# Patient Record
Sex: FEMALE | Race: BLACK OR AFRICAN AMERICAN | NOT HISPANIC OR LATINO | URBAN - METROPOLITAN AREA
[De-identification: names, ages, dates, MRNs, and addresses within clinical notes are randomized per-mention and may not be internally consistent; named-entity substitution may affect disease eponyms.]

---

## 2021-11-05 ENCOUNTER — INPATIENT (INPATIENT)
Facility: HOSPITAL | Age: 81
LOS: 2 days | Discharge: HOME HEALTH | End: 2021-11-08
Attending: HOSPITALIST | Admitting: HOSPITALIST
Payer: MEDICARE

## 2021-11-05 VITALS
TEMPERATURE: 98 F | RESPIRATION RATE: 18 BRPM | WEIGHT: 179.9 LBS | OXYGEN SATURATION: 97 % | SYSTOLIC BLOOD PRESSURE: 193 MMHG | DIASTOLIC BLOOD PRESSURE: 94 MMHG | HEIGHT: 64 IN | HEART RATE: 75 BPM

## 2021-11-05 LAB
ALBUMIN SERPL ELPH-MCNC: 3.6 G/DL — SIGNIFICANT CHANGE UP (ref 3.3–5)
ALP SERPL-CCNC: 59 U/L — SIGNIFICANT CHANGE UP (ref 40–120)
ALT FLD-CCNC: 14 U/L — SIGNIFICANT CHANGE UP (ref 12–78)
ANION GAP SERPL CALC-SCNC: 7 MMOL/L — SIGNIFICANT CHANGE UP (ref 5–17)
APTT BLD: 24.4 SEC — LOW (ref 27.5–35.5)
AST SERPL-CCNC: 17 U/L — SIGNIFICANT CHANGE UP (ref 15–37)
BASOPHILS # BLD AUTO: 0 K/UL — SIGNIFICANT CHANGE UP (ref 0–0.2)
BASOPHILS NFR BLD AUTO: 0 % — SIGNIFICANT CHANGE UP (ref 0–2)
BILIRUB SERPL-MCNC: 0.5 MG/DL — SIGNIFICANT CHANGE UP (ref 0.2–1.2)
BLD GP AB SCN SERPL QL: SIGNIFICANT CHANGE UP
BUN SERPL-MCNC: 19 MG/DL — SIGNIFICANT CHANGE UP (ref 7–23)
CALCIUM SERPL-MCNC: 9.2 MG/DL — SIGNIFICANT CHANGE UP (ref 8.5–10.1)
CHLORIDE SERPL-SCNC: 110 MMOL/L — HIGH (ref 96–108)
CO2 SERPL-SCNC: 26 MMOL/L — SIGNIFICANT CHANGE UP (ref 22–31)
CREAT SERPL-MCNC: 1.46 MG/DL — HIGH (ref 0.5–1.3)
EOSINOPHIL # BLD AUTO: 0 K/UL — SIGNIFICANT CHANGE UP (ref 0–0.5)
EOSINOPHIL NFR BLD AUTO: 0 % — SIGNIFICANT CHANGE UP (ref 0–6)
FLUAV AG NPH QL: SIGNIFICANT CHANGE UP
FLUBV AG NPH QL: SIGNIFICANT CHANGE UP
GLUCOSE BLDC GLUCOMTR-MCNC: 127 MG/DL — HIGH (ref 70–99)
GLUCOSE BLDC GLUCOMTR-MCNC: 168 MG/DL — HIGH (ref 70–99)
GLUCOSE BLDC GLUCOMTR-MCNC: 99 MG/DL — SIGNIFICANT CHANGE UP (ref 70–99)
GLUCOSE SERPL-MCNC: 121 MG/DL — HIGH (ref 70–99)
HCT VFR BLD CALC: 32.1 % — LOW (ref 34.5–45)
HGB BLD-MCNC: 10.3 G/DL — LOW (ref 11.5–15.5)
INR BLD: 1.07 RATIO — SIGNIFICANT CHANGE UP (ref 0.88–1.16)
LYMPHOCYTES # BLD AUTO: 1.03 K/UL — SIGNIFICANT CHANGE UP (ref 1–3.3)
LYMPHOCYTES # BLD AUTO: 30 % — SIGNIFICANT CHANGE UP (ref 13–44)
MANUAL SMEAR VERIFICATION: SIGNIFICANT CHANGE UP
MCHC RBC-ENTMCNC: 28.2 PG — SIGNIFICANT CHANGE UP (ref 27–34)
MCHC RBC-ENTMCNC: 32.1 GM/DL — SIGNIFICANT CHANGE UP (ref 32–36)
MCV RBC AUTO: 87.9 FL — SIGNIFICANT CHANGE UP (ref 80–100)
MONOCYTES # BLD AUTO: 0.21 K/UL — SIGNIFICANT CHANGE UP (ref 0–0.9)
MONOCYTES NFR BLD AUTO: 6 % — SIGNIFICANT CHANGE UP (ref 2–14)
NEUTROPHILS # BLD AUTO: 2.19 K/UL — SIGNIFICANT CHANGE UP (ref 1.8–7.4)
NEUTROPHILS NFR BLD AUTO: 64 % — SIGNIFICANT CHANGE UP (ref 43–77)
NRBC # BLD: 0 /100 — SIGNIFICANT CHANGE UP (ref 0–0)
NRBC # BLD: SIGNIFICANT CHANGE UP /100 WBCS (ref 0–0)
PLAT MORPH BLD: NORMAL — SIGNIFICANT CHANGE UP
PLATELET # BLD AUTO: 270 K/UL — SIGNIFICANT CHANGE UP (ref 150–400)
POTASSIUM SERPL-MCNC: 3.8 MMOL/L — SIGNIFICANT CHANGE UP (ref 3.5–5.3)
POTASSIUM SERPL-SCNC: 3.8 MMOL/L — SIGNIFICANT CHANGE UP (ref 3.5–5.3)
PROT SERPL-MCNC: 7.4 GM/DL — SIGNIFICANT CHANGE UP (ref 6–8.3)
PROTHROM AB SERPL-ACNC: 12.4 SEC — SIGNIFICANT CHANGE UP (ref 10.6–13.6)
RBC # BLD: 3.65 M/UL — LOW (ref 3.8–5.2)
RBC # FLD: 17.2 % — HIGH (ref 10.3–14.5)
RBC BLD AUTO: SIGNIFICANT CHANGE UP
SARS-COV-2 RNA SPEC QL NAA+PROBE: SIGNIFICANT CHANGE UP
SODIUM SERPL-SCNC: 143 MMOL/L — SIGNIFICANT CHANGE UP (ref 135–145)
TROPONIN I, HIGH SENSITIVITY RESULT: 70.5 NG/L — HIGH
WBC # BLD: 3.42 K/UL — LOW (ref 3.8–10.5)
WBC # FLD AUTO: 3.42 K/UL — LOW (ref 3.8–10.5)

## 2021-11-05 PROCEDURE — 70496 CT ANGIOGRAPHY HEAD: CPT | Mod: 26,MA

## 2021-11-05 PROCEDURE — 93010 ELECTROCARDIOGRAM REPORT: CPT

## 2021-11-05 PROCEDURE — 70498 CT ANGIOGRAPHY NECK: CPT | Mod: 26,MA

## 2021-11-05 PROCEDURE — 99291 CRITICAL CARE FIRST HOUR: CPT

## 2021-11-05 PROCEDURE — 93306 TTE W/DOPPLER COMPLETE: CPT | Mod: 26

## 2021-11-05 PROCEDURE — 71045 X-RAY EXAM CHEST 1 VIEW: CPT | Mod: 26

## 2021-11-05 PROCEDURE — 0042T: CPT

## 2021-11-05 RX ORDER — GABAPENTIN 400 MG/1
100 CAPSULE ORAL ONCE
Refills: 0 | Status: COMPLETED | OUTPATIENT
Start: 2021-11-05 | End: 2021-11-05

## 2021-11-05 RX ORDER — ALTEPLASE 100 MG
66.1 KIT INTRAVENOUS ONCE
Refills: 0 | Status: COMPLETED | OUTPATIENT
Start: 2021-11-05 | End: 2021-11-05

## 2021-11-05 RX ORDER — CHLORHEXIDINE GLUCONATE 213 G/1000ML
1 SOLUTION TOPICAL
Refills: 0 | Status: DISCONTINUED | OUTPATIENT
Start: 2021-11-05 | End: 2021-11-06

## 2021-11-05 RX ORDER — ALTEPLASE 100 MG
7.3 KIT INTRAVENOUS ONCE
Refills: 0 | Status: COMPLETED | OUTPATIENT
Start: 2021-11-05 | End: 2021-11-05

## 2021-11-05 RX ORDER — LABETALOL HCL 100 MG
20 TABLET ORAL ONCE
Refills: 0 | Status: COMPLETED | OUTPATIENT
Start: 2021-11-05 | End: 2021-11-05

## 2021-11-05 RX ORDER — SODIUM CHLORIDE 9 MG/ML
50 INJECTION INTRAMUSCULAR; INTRAVENOUS; SUBCUTANEOUS
Refills: 0 | Status: DISCONTINUED | OUTPATIENT
Start: 2021-11-05 | End: 2021-11-05

## 2021-11-05 RX ADMIN — ALTEPLASE 438 MILLIGRAM(S): KIT at 13:54

## 2021-11-05 RX ADMIN — Medication 20 MILLIGRAM(S): at 13:57

## 2021-11-05 RX ADMIN — SODIUM CHLORIDE 50 MILLILITER(S): 9 INJECTION INTRAMUSCULAR; INTRAVENOUS; SUBCUTANEOUS at 13:57

## 2021-11-05 RX ADMIN — GABAPENTIN 100 MILLIGRAM(S): 400 CAPSULE ORAL at 23:41

## 2021-11-05 RX ADMIN — ALTEPLASE 66.1 MILLIGRAM(S): KIT at 14:10

## 2021-11-05 RX ADMIN — ALTEPLASE 66.1 MILLIGRAM(S): KIT at 13:55

## 2021-11-05 RX ADMIN — ALTEPLASE 7.3 MILLIGRAM(S): KIT at 13:02

## 2021-11-05 NOTE — H&P ADULT - ASSESSMENT
82YO F hx of TIA, PCA CVA, HTN, p/w L arm, L leg weakness, onset 11 am on 11/05. code stroke called, NIH stroke scale 2, CT angio head/neck showing no abnormalities, no core infarct/hypoperfused tissue. tPA pushed at 13:48. admitted to ICU for 24h monitoring s/p tPA for ischemic stroke, likely MCA/MIKE.     Neuro  #ischemic stroke  - hx of TIA and PCA CVA, on plavix  - CT angio head/neck: no perfusion mismatches to suggest core infarct  - s/p tPA on 13:48 on 11/5  - neuro checks per tpa protocol  - 24h CT head  - neuro c/s  - f/u cholesterol studies, A1C  - PT/OT  - TTE ordered  - c/w home med gabapentin for restless leg    Cardiovascular  - /90  - home meds losartan, hydralazine, metoprolol   - restart home losartan, hydralazine, hold metoprolol given borderline HR    Respiratory  - no acute issues, satting upper 90s on RA    Gastrointestinal  - speech/swallow eval  - denies dysphagia  - NPO until eval    Renal/metabolic/electrolytes  #RUSSELL  - Cr 1.46, unclear baseline  - continue to monitor, strict I/Os    Endocrine  - glucose 120s, continue to monitor    Heme  - start dvt ppx 24h s/p tpa    Dispo: ICU, full code

## 2021-11-05 NOTE — H&P ADULT - NSHPPHYSICALEXAM_GEN_ALL_CORE
Gen: WDWN, NAD  HEENT: EOMI, no nasal discharge  CV: normal sinus rhythm, 2+ radial pulses b/l  Resp: no accessory muscle use, no increased work of breathing  GI: Abdomen soft non-distended, NTTP  MSK: No open wounds, no bruising, no LE edema  Neuro: A&Ox4, following commands, moving all four extremities spontaneously. CN3-12 intact, EOMI. PERRLA, 3/5 strength in L LE, 5/5 in R LE; strength 4/5 L LE, 5/5 R LE. Sensation intact bl in UE/LE.   Psych: appropriate mood

## 2021-11-05 NOTE — H&P ADULT - HISTORY OF PRESENT ILLNESS
82YO F hx of TIA, p/w L arm weakness and slurred speech, onset 11 am on 11/05. code stroke called, CT angio head/neck showing no abnormalities, no core infarct/hypoperfused tissue. tPA pushed at *** 80YO F hx of TIA, PCA CVA, HTN, p/w L arm, L leg weakness, onset 11 am on 11/05. code stroke called, CT angio head/neck showing no abnormalities, no core infarct/hypoperfused tissue. tPA pushed at 13:48. Home meds include losartan, hydralazine, metoprolol, plavix, gabapentin (restless leg syndrome). Pt denies h/a, visual changes, nausea, vomiting, aphasia, confusion, numbness/tingling of arms/legs.

## 2021-11-05 NOTE — H&P ADULT - NSHPREVIEWOFSYSTEMS_GEN_ALL_CORE
Gen: Denies fevers  HEENT: denies h/a  CV: Denies chest pain  Resp: Denies SOB  GI: Denies nausea, vomiting  Msk: Denies arm/leg pain  Neuro: + arm/leg weakness. Denies numbness/tingling of arms/legs.

## 2021-11-05 NOTE — PATIENT PROFILE ADULT - TOBACCO USE
MEDICATION PRIOR AUTHORIZATION NEEDED:  Currently pending    1. Name of Medication: Androgel    2. Requested By (Name of Pharmacy): [facility]     3. Is insurance on file current? Medicare    4. What is the name & phone number of the 3rd party payor?           Never smoker

## 2021-11-05 NOTE — ED PROVIDER NOTE - OBJECTIVE STATEMENT
This patient is a 81 year old woman hx of HTN and CVA 15 years ago with no residual weakness who presents to the ER c/o new left sided weakness.  Patient states that she woke up feeling well and remembers walking down to the basement to get some household things.  Last known well 10:30.  Patient states that about 11 am she was dropping objects from her left hands, unable to hold things and her left arm was weak.  She denies vision loss, headache and speech difficulty.

## 2021-11-05 NOTE — ED PROVIDER NOTE - CLINICAL SUMMARY MEDICAL DECISION MAKING FREE TEXT BOX
New onset left sided weakness patient Last known well 10:30.  Code stroke called.  Telestroke called.  Patient sent to CT immediately.  Will check results, check labs, and follow-up with telestroke.

## 2021-11-05 NOTE — H&P ADULT - NSHPLABSRESULTS_GEN_ALL_CORE
10.3   3.42  )-----------( 270      ( 05 Nov 2021 13:34 )             32.1     11-05    143  |  110<H>  |  19  ----------------------------<  121<H>  3.8   |  26  |  1.46<H>    Ca    9.2      05 Nov 2021 13:34    TPro  7.4  /  Alb  3.6  /  TBili  0.5  /  DBili  x   /  AST  17  /  ALT  14  /  AlkPhos  59  11-05      EXAM:  CT PERFUSION W MAPS IC                        PROCEDURE DATE:  11/05/2021    INTERPRETATION:  Clinical indication: Stroke code.  CT perfusion was performed.  Calcification involving the carotid bifurcation region bilaterally seen. Both distal common carotid, proximal internal and external carotid arteries appear normal without significant stenosis seen.  Both vertebral arteries demonstrate normal enhancement. Dominant right vertebral artery is seen.  Both distal internal carotid arteries appear normal. Elevation of the anterior cerebral demonstrates an azygos anterior cerebral artery without significant stenosis. Evaluation of both middle cerebral basilar and posterior cerebral arteries appear normal without evidence of an aneurysm or significant stenosis.  The dural venous sinuses demonstrate normal enhancement. Hypoplastic left transverse and sigmoid sinus is seen.  Evaluation of the soft tissue neck region is limited by arterial phase though grossly unremarkable  The visualized portions of both lung apices appear clear.  IMPRESSION: Unremarkable CTA of the neck and [place] of [place].  No perfusion mismatches to suggest core infarct or critically hypoperfused tissue at risk.

## 2021-11-05 NOTE — H&P ADULT - ATTENDING COMMENTS
81 year old with left sided weakness, consistent with cva. now s/p tpa. will admit to icu for neuro check, pt/ot and further stroke work up.

## 2021-11-05 NOTE — ED ADULT NURSE NOTE - OBJECTIVE STATEMENT
pt with reports of being able to go down the stairs in her home and coming back up the stairs she dropped something and then was unable to pick it up with her left hand-pt reports that she was also able to walk to her bed but when she sat down and tried to get up she was unable to use her left leg-she then called her brother and told him that she thinks that she had a stroke.

## 2021-11-05 NOTE — PATIENT PROFILE ADULT - STATED REASON FOR ADMISSION
Patient states she was helping feeding the cat, and all of a sudden items fell out of her left hand and was unable to pick it up. Pt admitted for stroke.

## 2021-11-05 NOTE — ED ADULT TRIAGE NOTE - CHIEF COMPLAINT QUOTE
pt biba from fome c/o left arm weakness with a drift started around 11 am today accompanied with slurred speech hx TIA, pt seen evaluated by Dr Dubose at triage code janine called pt biba from home c/o left arm weakness with a drift started around 11 am today accompanied with slurred speech hx TIA, pt seen evaluated by Dr Dubose at triage code janine called

## 2021-11-05 NOTE — ED ADULT NURSE NOTE - CHIEF COMPLAINT QUOTE
pt biba from home c/o left arm weakness with a drift started around 11 am today accompanied with slurred speech hx TIA, pt seen evaluated by Dr Dubose at triage code janine called

## 2021-11-05 NOTE — ED ADULT NURSE NOTE - NSFALLRSKOUTCOME_ED_ALL_ED
Bleeding that does not stop/Pain not relieved by Medications/Fever greater than 101/Swelling that continues Universal Safety Interventions

## 2021-11-06 LAB
A1C WITH ESTIMATED AVERAGE GLUCOSE RESULT: 5.5 % — SIGNIFICANT CHANGE UP (ref 4–5.6)
ALBUMIN SERPL ELPH-MCNC: 3 G/DL — LOW (ref 3.3–5)
ALP SERPL-CCNC: 54 U/L — SIGNIFICANT CHANGE UP (ref 40–120)
ALT FLD-CCNC: 11 U/L — LOW (ref 12–78)
ANION GAP SERPL CALC-SCNC: 4 MMOL/L — LOW (ref 5–17)
AST SERPL-CCNC: 18 U/L — SIGNIFICANT CHANGE UP (ref 15–37)
BILIRUB SERPL-MCNC: 0.5 MG/DL — SIGNIFICANT CHANGE UP (ref 0.2–1.2)
BUN SERPL-MCNC: 14 MG/DL — SIGNIFICANT CHANGE UP (ref 7–23)
CALCIUM SERPL-MCNC: 8.9 MG/DL — SIGNIFICANT CHANGE UP (ref 8.5–10.1)
CHLORIDE SERPL-SCNC: 111 MMOL/L — HIGH (ref 96–108)
CHOLEST SERPL-MCNC: 178 MG/DL — SIGNIFICANT CHANGE UP
CO2 SERPL-SCNC: 26 MMOL/L — SIGNIFICANT CHANGE UP (ref 22–31)
COVID-19 NUCLEOCAPSID GAM AB INTERP: POSITIVE
COVID-19 NUCLEOCAPSID TOTAL GAM ANTIBODY RESULT: 42.5 INDEX — HIGH
COVID-19 SPIKE DOMAIN AB INTERP: POSITIVE
COVID-19 SPIKE DOMAIN ANTIBODY RESULT: >250 U/ML — HIGH
CREAT SERPL-MCNC: 1.11 MG/DL — SIGNIFICANT CHANGE UP (ref 0.5–1.3)
ESTIMATED AVERAGE GLUCOSE: 111 MG/DL — SIGNIFICANT CHANGE UP (ref 68–114)
GLUCOSE SERPL-MCNC: 91 MG/DL — SIGNIFICANT CHANGE UP (ref 70–99)
HCT VFR BLD CALC: 31 % — LOW (ref 34.5–45)
HDLC SERPL-MCNC: 60 MG/DL — SIGNIFICANT CHANGE UP
HGB BLD-MCNC: 9.9 G/DL — LOW (ref 11.5–15.5)
LIPID PNL WITH DIRECT LDL SERPL: 105 MG/DL — HIGH
MAGNESIUM SERPL-MCNC: 2.2 MG/DL — SIGNIFICANT CHANGE UP (ref 1.6–2.6)
MCHC RBC-ENTMCNC: 28.2 PG — SIGNIFICANT CHANGE UP (ref 27–34)
MCHC RBC-ENTMCNC: 31.9 GM/DL — LOW (ref 32–36)
MCV RBC AUTO: 88.3 FL — SIGNIFICANT CHANGE UP (ref 80–100)
NON HDL CHOLESTEROL: 118 MG/DL — SIGNIFICANT CHANGE UP
NRBC # BLD: 0 /100 WBCS — SIGNIFICANT CHANGE UP (ref 0–0)
PHOSPHATE SERPL-MCNC: 3.4 MG/DL — SIGNIFICANT CHANGE UP (ref 2.5–4.5)
PLATELET # BLD AUTO: 249 K/UL — SIGNIFICANT CHANGE UP (ref 150–400)
POTASSIUM SERPL-MCNC: 3.8 MMOL/L — SIGNIFICANT CHANGE UP (ref 3.5–5.3)
POTASSIUM SERPL-SCNC: 3.8 MMOL/L — SIGNIFICANT CHANGE UP (ref 3.5–5.3)
PROT SERPL-MCNC: 6.7 GM/DL — SIGNIFICANT CHANGE UP (ref 6–8.3)
RAPID RVP RESULT: SIGNIFICANT CHANGE UP
RBC # BLD: 3.51 M/UL — LOW (ref 3.8–5.2)
RBC # FLD: 17.2 % — HIGH (ref 10.3–14.5)
SARS-COV-2 IGG+IGM SERPL QL IA: 42.5 INDEX — HIGH
SARS-COV-2 IGG+IGM SERPL QL IA: >250 U/ML — HIGH
SARS-COV-2 IGG+IGM SERPL QL IA: POSITIVE
SARS-COV-2 IGG+IGM SERPL QL IA: POSITIVE
SARS-COV-2 RNA SPEC QL NAA+PROBE: SIGNIFICANT CHANGE UP
SODIUM SERPL-SCNC: 141 MMOL/L — SIGNIFICANT CHANGE UP (ref 135–145)
TRIGL SERPL-MCNC: 65 MG/DL — SIGNIFICANT CHANGE UP
WBC # BLD: 3.07 K/UL — LOW (ref 3.8–10.5)
WBC # FLD AUTO: 3.07 K/UL — LOW (ref 3.8–10.5)

## 2021-11-06 PROCEDURE — 70551 MRI BRAIN STEM W/O DYE: CPT | Mod: 26

## 2021-11-06 PROCEDURE — 70544 MR ANGIOGRAPHY HEAD W/O DYE: CPT | Mod: 26,59

## 2021-11-06 PROCEDURE — 99291 CRITICAL CARE FIRST HOUR: CPT

## 2021-11-06 RX ORDER — ATORVASTATIN CALCIUM 80 MG/1
80 TABLET, FILM COATED ORAL AT BEDTIME
Refills: 0 | Status: DISCONTINUED | OUTPATIENT
Start: 2021-11-06 | End: 2021-11-08

## 2021-11-06 RX ORDER — DIPHENHYDRAMINE HCL 50 MG
25 CAPSULE ORAL ONCE
Refills: 0 | Status: COMPLETED | OUTPATIENT
Start: 2021-11-06 | End: 2021-11-06

## 2021-11-06 RX ORDER — AMLODIPINE BESYLATE 2.5 MG/1
2.5 TABLET ORAL DAILY
Refills: 0 | Status: DISCONTINUED | OUTPATIENT
Start: 2021-11-06 | End: 2021-11-06

## 2021-11-06 RX ORDER — PANTOPRAZOLE SODIUM 20 MG/1
40 TABLET, DELAYED RELEASE ORAL
Refills: 0 | Status: DISCONTINUED | OUTPATIENT
Start: 2021-11-06 | End: 2021-11-08

## 2021-11-06 RX ORDER — ENOXAPARIN SODIUM 100 MG/ML
40 INJECTION SUBCUTANEOUS DAILY
Refills: 0 | Status: DISCONTINUED | OUTPATIENT
Start: 2021-11-06 | End: 2021-11-08

## 2021-11-06 RX ORDER — ASPIRIN/CALCIUM CARB/MAGNESIUM 324 MG
81 TABLET ORAL DAILY
Refills: 0 | Status: DISCONTINUED | OUTPATIENT
Start: 2021-11-06 | End: 2021-11-08

## 2021-11-06 RX ORDER — SIMVASTATIN 20 MG/1
40 TABLET, FILM COATED ORAL AT BEDTIME
Refills: 0 | Status: DISCONTINUED | OUTPATIENT
Start: 2021-11-06 | End: 2021-11-06

## 2021-11-06 RX ORDER — AMLODIPINE BESYLATE 2.5 MG/1
2.5 TABLET ORAL DAILY
Refills: 0 | Status: DISCONTINUED | OUTPATIENT
Start: 2021-11-06 | End: 2021-11-07

## 2021-11-06 RX ORDER — CLOPIDOGREL BISULFATE 75 MG/1
75 TABLET, FILM COATED ORAL DAILY
Refills: 0 | Status: DISCONTINUED | OUTPATIENT
Start: 2021-11-06 | End: 2021-11-08

## 2021-11-06 RX ADMIN — CLOPIDOGREL BISULFATE 75 MILLIGRAM(S): 75 TABLET, FILM COATED ORAL at 17:19

## 2021-11-06 RX ADMIN — ENOXAPARIN SODIUM 40 MILLIGRAM(S): 100 INJECTION SUBCUTANEOUS at 17:19

## 2021-11-06 RX ADMIN — CHLORHEXIDINE GLUCONATE 1 APPLICATION(S): 213 SOLUTION TOPICAL at 12:45

## 2021-11-06 RX ADMIN — PANTOPRAZOLE SODIUM 40 MILLIGRAM(S): 20 TABLET, DELAYED RELEASE ORAL at 17:19

## 2021-11-06 RX ADMIN — ATORVASTATIN CALCIUM 80 MILLIGRAM(S): 80 TABLET, FILM COATED ORAL at 21:54

## 2021-11-06 RX ADMIN — Medication 81 MILLIGRAM(S): at 17:18

## 2021-11-06 RX ADMIN — AMLODIPINE BESYLATE 2.5 MILLIGRAM(S): 2.5 TABLET ORAL at 21:55

## 2021-11-06 RX ADMIN — Medication 25 MILLIGRAM(S): at 22:44

## 2021-11-06 NOTE — CONSULT NOTE ADULT - ASSESSMENT
Left sided weakness s/p TPA- Likely CVA  HTN  Past TIAs    Plan  She was on Plavix previously, would do ASA/Plavix for 3 months if repeat hct shows no hemmorhage  Lipitor 80mg  okay to be downgraded to tele  MRI Brain w/o  PT/OT/ST  echo, tele  will continue to follow

## 2021-11-06 NOTE — CHART NOTE - NSCHARTNOTEFT_GEN_A_CORE
82YO F hx of TIA, PCA CVA, HTN, presented to ED with L arm, L leg weakness, onset 11 am on 11/05. code stroke called, CT angio head/neck showing no abnormalities, no core infarct/hypoperfused tissue. Admitted to ICU for 24h monitoring s/p tPA for ischemic stroke, likely MCA/MIKE. Neuro checks and vital signs stable.  Pt alert and oriented, minimal right sided residual weakness (4/5 strength) noted. DVT ppx and statin ordered. MRI of Brain done- Small acute/subacute infarcts within the right motor strip with associated cytotoxic edema and without hemorrhagic transformation. Multiple additional chronic infarcts and mild chronic white matter microvascular type changes, as discussed. MRA head: No large vessel occlusion or major stenosis. seen By Dr Posadas (neurology), Ok to downgrade to telemetry ASA, plavix for 3 months as per neuro. PT/OT ordered, follow up echo, passed bedside swallow, diet initiated.  starting norvasc, no beta blocker as patient is baseline bradycardic. Pt seen and examined by ICU attendingkayla for transfer to telemetry.  Report given to Dr Sloan and placed on Dr Roman service.
Notified by Transfer Center Regarding TELESTROKE Activation at [facility].  History obtained from EMR and ED Team Member (Dr. Dubose).     Briefly, patient is a 81y Female with history of HTN who presented with complaints of Left sided weakness. Patient was reportedly at her neurologic baseline until 10:30am when she all of a sudden had trouble using her left hand and was dropping things from her left hand. Patient also reportedly had difficulty ambulating due to Left leg weakness though normally is able to ambulate without assistance.      In the ED exam notable for LUE drift and LLE drift with patient having difficulty ambulating.   NIHSS: 2  Pre-MRS: 0    T(C): 36.6 (11-05-21 @ 15:22), Max: 36.6 (11-05-21 @ 13:01)  HR: 61 (11-05-21 @ 15:22) (56 - 75)  BP: 175/46 (11-05-21 @ 15:22) (174/93 - 193/94)  RR: 16 (11-05-21 @ 15:22) (12 - 18)  SpO2: 100% (11-05-21 @ 15:22) (97% - 100%)    c< from: CT Brain Stroke Protocol (11.05.21 @ 13:14) >  IMPRESSION: Old right PCA infarct  < end of copied text >  < from: CT Angio Neck w/ IV Cont (11.05.21 @ 13:28) >  IMPRESSION: Unremarkable CTA of the neck and [place] of [place].  No perfusion mismatches to suggest core infarct or critically hypoperfused tissue at risk.  < end of copied text >    CTH/CTA/CTP reviewed.     Impression: Acute L hemiparesis probably 2/2 R brain dysfunction; likely due to acute ischemic stroke 2/2 cryptogenic etiology    Recommendations:   [] Patient presenting with sudden onset focal neurologic deficits which are likely disabling within 4.5 hours; would administer tPA (given at 13:48pm)  [] No LVO noted on CTA therefore not a candidate for endovascular treatment at this time.     Plan discussed with Stroke Attending, Dr. Bryant.

## 2021-11-06 NOTE — CONSULT NOTE ADULT - SUBJECTIVE AND OBJECTIVE BOX
81y Female with history of HTN, TIAs? who presented with complaints of Left sided weakness. Patient was reportedly at her neurologic baseline until 10:30am when she all of a sudden had trouble using her left hand and was dropping things from her left hand. Patient also reportedly had difficulty ambulating due to Left leg weakness though normally is able to ambulate without assistance.  Pt received TPA at 148 pm, says weakness improved.

## 2021-11-07 LAB
ALBUMIN SERPL ELPH-MCNC: 3.2 G/DL — LOW (ref 3.3–5)
ALP SERPL-CCNC: 57 U/L — SIGNIFICANT CHANGE UP (ref 40–120)
ALT FLD-CCNC: 13 U/L — SIGNIFICANT CHANGE UP (ref 12–78)
ANION GAP SERPL CALC-SCNC: 5 MMOL/L — SIGNIFICANT CHANGE UP (ref 5–17)
AST SERPL-CCNC: 12 U/L — LOW (ref 15–37)
BILIRUB SERPL-MCNC: 0.6 MG/DL — SIGNIFICANT CHANGE UP (ref 0.2–1.2)
BUN SERPL-MCNC: 10 MG/DL — SIGNIFICANT CHANGE UP (ref 7–23)
CALCIUM SERPL-MCNC: 9.1 MG/DL — SIGNIFICANT CHANGE UP (ref 8.5–10.1)
CHLORIDE SERPL-SCNC: 109 MMOL/L — HIGH (ref 96–108)
CO2 SERPL-SCNC: 29 MMOL/L — SIGNIFICANT CHANGE UP (ref 22–31)
CREAT SERPL-MCNC: 1.23 MG/DL — SIGNIFICANT CHANGE UP (ref 0.5–1.3)
GLUCOSE SERPL-MCNC: 85 MG/DL — SIGNIFICANT CHANGE UP (ref 70–99)
HCT VFR BLD CALC: 31.8 % — LOW (ref 34.5–45)
HGB BLD-MCNC: 10.3 G/DL — LOW (ref 11.5–15.5)
MAGNESIUM SERPL-MCNC: 2.1 MG/DL — SIGNIFICANT CHANGE UP (ref 1.6–2.6)
MCHC RBC-ENTMCNC: 28.5 PG — SIGNIFICANT CHANGE UP (ref 27–34)
MCHC RBC-ENTMCNC: 32.4 GM/DL — SIGNIFICANT CHANGE UP (ref 32–36)
MCV RBC AUTO: 87.8 FL — SIGNIFICANT CHANGE UP (ref 80–100)
NRBC # BLD: 0 /100 WBCS — SIGNIFICANT CHANGE UP (ref 0–0)
PHOSPHATE SERPL-MCNC: 3.1 MG/DL — SIGNIFICANT CHANGE UP (ref 2.5–4.5)
PLATELET # BLD AUTO: 259 K/UL — SIGNIFICANT CHANGE UP (ref 150–400)
POTASSIUM SERPL-MCNC: 4 MMOL/L — SIGNIFICANT CHANGE UP (ref 3.5–5.3)
POTASSIUM SERPL-SCNC: 4 MMOL/L — SIGNIFICANT CHANGE UP (ref 3.5–5.3)
PROT SERPL-MCNC: 7 GM/DL — SIGNIFICANT CHANGE UP (ref 6–8.3)
RBC # BLD: 3.62 M/UL — LOW (ref 3.8–5.2)
RBC # FLD: 17.1 % — HIGH (ref 10.3–14.5)
SODIUM SERPL-SCNC: 143 MMOL/L — SIGNIFICANT CHANGE UP (ref 135–145)
WBC # BLD: 2.92 K/UL — LOW (ref 3.8–10.5)
WBC # FLD AUTO: 2.92 K/UL — LOW (ref 3.8–10.5)

## 2021-11-07 PROCEDURE — 99233 SBSQ HOSP IP/OBS HIGH 50: CPT

## 2021-11-07 PROCEDURE — 71045 X-RAY EXAM CHEST 1 VIEW: CPT | Mod: 26

## 2021-11-07 RX ORDER — HYDRALAZINE HCL 50 MG
75 TABLET ORAL
Refills: 0 | Status: DISCONTINUED | OUTPATIENT
Start: 2021-11-07 | End: 2021-11-08

## 2021-11-07 RX ORDER — METOPROLOL TARTRATE 50 MG
50 TABLET ORAL
Refills: 0 | Status: DISCONTINUED | OUTPATIENT
Start: 2021-11-07 | End: 2021-11-08

## 2021-11-07 RX ADMIN — CLOPIDOGREL BISULFATE 75 MILLIGRAM(S): 75 TABLET, FILM COATED ORAL at 11:23

## 2021-11-07 RX ADMIN — AMLODIPINE BESYLATE 2.5 MILLIGRAM(S): 2.5 TABLET ORAL at 05:32

## 2021-11-07 RX ADMIN — PANTOPRAZOLE SODIUM 40 MILLIGRAM(S): 20 TABLET, DELAYED RELEASE ORAL at 05:32

## 2021-11-07 RX ADMIN — Medication 75 MILLIGRAM(S): at 20:28

## 2021-11-07 RX ADMIN — ENOXAPARIN SODIUM 40 MILLIGRAM(S): 100 INJECTION SUBCUTANEOUS at 11:24

## 2021-11-07 RX ADMIN — Medication 81 MILLIGRAM(S): at 11:23

## 2021-11-07 RX ADMIN — ATORVASTATIN CALCIUM 80 MILLIGRAM(S): 80 TABLET, FILM COATED ORAL at 21:55

## 2021-11-07 NOTE — PHYSICAL THERAPY INITIAL EVALUATION ADULT - PERTINENT HX OF CURRENT PROBLEM, REHAB EVAL
Pt had CVA R coni in 2011 and admitted 11/5/21  for CVA L coni with Chart reviewed. Events to date noted. Pt is an----- yo ---- admitted r/o CVA now s/p tPA on---- at ---- Pt cannot be seen for PT eval until after------ on ------ and follow up imaging confirmed. PT to continue to follow. given 11/5/21. f/u MRI negative  for hemorrhagic conversion. Pt had CVA R coni in 2011 and admitted 11/5/21  for CVA L coni  now s/p tPA on11/5/21 . f/u MRI negative  for hemorrhagic conversion.

## 2021-11-07 NOTE — PROGRESS NOTE ADULT - ASSESSMENT
82YO F hx of TIA, PCA CVA, HTN, presented to ED with L arm, L leg weakness, onset 11 am on 11/05. code stroke called, CT angio head/neck showing no abnormalities, no core infarct/hypoperfused tissue. Admitted to ICU for 24h monitoring s/p tPA for ischemic stroke, likely MCA/MIKE. Neuro checks and vital signs stable.  Pt alert and oriented, minimal right sided residual weakness (4/5 strength) noted. DVT ppx and statin ordered. MRI of Brain done- Small acute/subacute infarcts within the right motor strip with associated cytotoxic edema and without hemorrhagic transformation. Multiple additional chronic infarcts and mild chronic white matter microvascular type changes, as discussed. MRA head: No large vessel occlusion or major stenosis. seen By Dr Posadas (neurology), Ok to downgrade to telemetry ASA, plavix for 3 months as per neuro.    CVA   - HDS and AAOx4. mild left sided weakness   -  PT/OT ordered,   - follow up echo  -  no events on tele   - asa/plavix for months     HTN   -on hydralazine 75 mg bid at home   - start home BB at haldf dose and watch for bradycardia.     dispo   - pending pt consult but pt is adamant about going home
80YO F hx of TIA, PCA CVA, HTN, p/w L arm, L leg weakness, onset 11 am on 11/05. code stroke called, NIH stroke scale 2, CT angio head/neck showing no abnormalities, no core infarct/hypoperfused tissue. tPA pushed at 13:48. admitted to ICU for 24h monitoring s/p tPA for ischemic stroke, likely MCA/MIKE.     Neuro  #ischemic stroke  - hx of TIA and PCA CVA, on plavix  - CT angio head/neck: no perfusion mismatches to suggest core infarct  - s/p tPA on 13:48 on 11/5  - neuro checks per tpa protocol  - 24h CT head  - neuro c/s  - f/u cholesterol studies, A1C  - PT/OT  - TTE ordered  - c/w home med gabapentin for restless leg    Cardiovascular  - /90  - home meds losartan, hydralazine, metoprolol   - restart home losartan, hydralazine, hold metoprolol given borderline HR    Respiratory  - no acute issues, satting upper 90s on RA    Gastrointestinal  - speech/swallow eval  - denies dysphagia  - NPO until eval    Renal/metabolic/electrolytes  #RUSSELL  - Cr 1.46, unclear baseline  - continue to monitor, strict I/Os    Endocrine  - glucose 120s, continue to monitor    Heme  - start dvt ppx 24h s/p tpa    Dispo: transfer to floor, full code

## 2021-11-07 NOTE — PHYSICAL THERAPY INITIAL EVALUATION ADULT - ADDITIONAL COMMENTS
Lives in [facility] of a  with 4 steps to enetr with bh/l closeby handrails . Pt has side entrance with 2 steps and one RHR. Pt newton snot go outside home alone - her brother always accompanies her. She is able to do simple , cooking,, cleaning at home. Brother helps with carrying heavy objects like grocery, laundry etc.

## 2021-11-08 VITALS — DIASTOLIC BLOOD PRESSURE: 87 MMHG | OXYGEN SATURATION: 97 % | HEART RATE: 79 BPM | SYSTOLIC BLOOD PRESSURE: 145 MMHG

## 2021-11-08 LAB
ANION GAP SERPL CALC-SCNC: 9 MMOL/L — SIGNIFICANT CHANGE UP (ref 5–17)
BUN SERPL-MCNC: 12 MG/DL — SIGNIFICANT CHANGE UP (ref 7–23)
CALCIUM SERPL-MCNC: 9.5 MG/DL — SIGNIFICANT CHANGE UP (ref 8.5–10.1)
CHLORIDE SERPL-SCNC: 105 MMOL/L — SIGNIFICANT CHANGE UP (ref 96–108)
CO2 SERPL-SCNC: 25 MMOL/L — SIGNIFICANT CHANGE UP (ref 22–31)
CREAT SERPL-MCNC: 1.1 MG/DL — SIGNIFICANT CHANGE UP (ref 0.5–1.3)
GLUCOSE SERPL-MCNC: 96 MG/DL — SIGNIFICANT CHANGE UP (ref 70–99)
HCT VFR BLD CALC: 36.4 % — SIGNIFICANT CHANGE UP (ref 34.5–45)
HGB BLD-MCNC: 11.7 G/DL — SIGNIFICANT CHANGE UP (ref 11.5–15.5)
MCHC RBC-ENTMCNC: 27.9 PG — SIGNIFICANT CHANGE UP (ref 27–34)
MCHC RBC-ENTMCNC: 32.1 GM/DL — SIGNIFICANT CHANGE UP (ref 32–36)
MCV RBC AUTO: 86.7 FL — SIGNIFICANT CHANGE UP (ref 80–100)
NRBC # BLD: 0 /100 WBCS — SIGNIFICANT CHANGE UP (ref 0–0)
PLATELET # BLD AUTO: 321 K/UL — SIGNIFICANT CHANGE UP (ref 150–400)
POTASSIUM SERPL-MCNC: 3.9 MMOL/L — SIGNIFICANT CHANGE UP (ref 3.5–5.3)
POTASSIUM SERPL-SCNC: 3.9 MMOL/L — SIGNIFICANT CHANGE UP (ref 3.5–5.3)
RBC # BLD: 4.2 M/UL — SIGNIFICANT CHANGE UP (ref 3.8–5.2)
RBC # FLD: 17 % — HIGH (ref 10.3–14.5)
SODIUM SERPL-SCNC: 139 MMOL/L — SIGNIFICANT CHANGE UP (ref 135–145)
WBC # BLD: 4.41 K/UL — SIGNIFICANT CHANGE UP (ref 3.8–10.5)
WBC # FLD AUTO: 4.41 K/UL — SIGNIFICANT CHANGE UP (ref 3.8–10.5)

## 2021-11-08 PROCEDURE — 99239 HOSP IP/OBS DSCHRG MGMT >30: CPT

## 2021-11-08 RX ORDER — CLOPIDOGREL BISULFATE 75 MG/1
1 TABLET, FILM COATED ORAL
Qty: 0 | Refills: 0 | DISCHARGE

## 2021-11-08 RX ORDER — GABAPENTIN 400 MG/1
1 CAPSULE ORAL
Qty: 0 | Refills: 0 | DISCHARGE

## 2021-11-08 RX ORDER — PANTOPRAZOLE SODIUM 20 MG/1
1 TABLET, DELAYED RELEASE ORAL
Qty: 30 | Refills: 0
Start: 2021-11-08 | End: 2021-12-07

## 2021-11-08 RX ORDER — METOPROLOL TARTRATE 50 MG
1 TABLET ORAL
Qty: 0 | Refills: 0 | DISCHARGE

## 2021-11-08 RX ORDER — HYDRALAZINE HCL 50 MG
1 TABLET ORAL
Qty: 0 | Refills: 0 | DISCHARGE

## 2021-11-08 RX ORDER — METOPROLOL TARTRATE 50 MG
1 TABLET ORAL
Qty: 60 | Refills: 0
Start: 2021-11-08 | End: 2021-12-07

## 2021-11-08 RX ORDER — ASPIRIN/CALCIUM CARB/MAGNESIUM 324 MG
1 TABLET ORAL
Qty: 30 | Refills: 0
Start: 2021-11-08 | End: 2021-12-07

## 2021-11-08 RX ORDER — HYDRALAZINE HCL 50 MG
3 TABLET ORAL
Qty: 180 | Refills: 0
Start: 2021-11-08 | End: 2021-12-07

## 2021-11-08 RX ORDER — ATORVASTATIN CALCIUM 80 MG/1
1 TABLET, FILM COATED ORAL
Qty: 30 | Refills: 0
Start: 2021-11-08 | End: 2021-12-07

## 2021-11-08 RX ORDER — LOSARTAN POTASSIUM 100 MG/1
1 TABLET, FILM COATED ORAL
Qty: 0 | Refills: 0 | DISCHARGE

## 2021-11-08 RX ADMIN — Medication 50 MILLIGRAM(S): at 05:32

## 2021-11-08 RX ADMIN — CLOPIDOGREL BISULFATE 75 MILLIGRAM(S): 75 TABLET, FILM COATED ORAL at 11:15

## 2021-11-08 RX ADMIN — ENOXAPARIN SODIUM 40 MILLIGRAM(S): 100 INJECTION SUBCUTANEOUS at 11:15

## 2021-11-08 RX ADMIN — PANTOPRAZOLE SODIUM 40 MILLIGRAM(S): 20 TABLET, DELAYED RELEASE ORAL at 05:32

## 2021-11-08 RX ADMIN — Medication 75 MILLIGRAM(S): at 05:32

## 2021-11-08 RX ADMIN — Medication 81 MILLIGRAM(S): at 11:15

## 2021-11-08 NOTE — DISCHARGE NOTE PROVIDER - NSDCMRMEDTOKEN_GEN_ALL_CORE_FT
aspirin 81 mg oral delayed release tablet: 1 tab(s) orally once a day  atorvastatin 80 mg oral tablet: 1 tab(s) orally once a day (at bedtime)  clopidogrel 75 mg oral tablet: 1 tab(s) orally once a day  gabapentin 300 mg oral capsule: 1 cap(s) orally 2 times a day  hydrALAZINE 25 mg oral tablet: 3 tab(s) orally 2 times a day  metoprolol tartrate 50 mg oral tablet: 1 tab(s) orally 2 times a day  pantoprazole 40 mg oral delayed release tablet: 1 tab(s) orally once a day (before a meal)  Physical Therapy: Physical Therapy eval and treatment.

## 2021-11-08 NOTE — DISCHARGE NOTE PROVIDER - NSDCDCMDCOMP_GEN_ALL_CORE
Recommend next available consult with Dr. Hammond Proper. This document is complete and the patient is ready for discharge.

## 2021-11-08 NOTE — DISCHARGE NOTE PROVIDER - NSDCCPCAREPLAN_GEN_ALL_CORE_FT
PRINCIPAL DISCHARGE DIAGNOSIS  Diagnosis: Stroke  Assessment and Plan of Treatment: Continue Home PT, Follow up with Dr Posadas, neurologist.

## 2021-11-08 NOTE — OCCUPATIONAL THERAPY INITIAL EVALUATION ADULT - COORDINATION ASSESSED, REHAB EVAL
RUE finger to nose grossly intact; LUE finger to nose mildly grossly impaired; BLE heel to shin intact.

## 2021-11-08 NOTE — OCCUPATIONAL THERAPY INITIAL EVALUATION ADULT - ADL RETRAINING, OT EVAL
Patient will be able to perform functional tasks with independence, using least restrictive device, within 2-4 weeks.

## 2021-11-08 NOTE — OCCUPATIONAL THERAPY INITIAL EVALUATION ADULT - IMPAIRMENTS CONTRIBUTING IMPAIRED BED MOBILITY, REHAB EVAL
impaired balance/impaired coordination/impaired motor control/impaired postural control/impaired sensory feedback/decreased strength

## 2021-11-08 NOTE — OCCUPATIONAL THERAPY INITIAL EVALUATION ADULT - NS ASR FOLLOW COMMAND OT EVAL
required verbal cues for task segmentation./100% of the time/50% of the time/able to follow multistep instructions/able to follow single-step instructions

## 2021-11-08 NOTE — OCCUPATIONAL THERAPY INITIAL EVALUATION ADULT - STRENGTHENING, PT EVAL
Pt will increase LUE/LLE strength to 5/5 to improve functional strength needed to engage in functional tasks by 4 weeks

## 2021-11-08 NOTE — OCCUPATIONAL THERAPY INITIAL EVALUATION ADULT - PERTINENT HX OF CURRENT PROBLEM, REHAB EVAL
82YO F hx of TIA, PCA CVA, HTN, presented to [facility] with L arm; L leg weakness. Admitted to ICU for 24h monitoring s/p tPA for ischemic stroke. MRI of Brain done- Small acute/subacute infarcts within the right motor strip with associated cytotoxic edema and without hemorrhagic transformation.

## 2021-11-08 NOTE — OCCUPATIONAL THERAPY INITIAL EVALUATION ADULT - TRANSFER TRAINING, PT EVAL
Patient will be able to perform functional transfers, using least restrictive device, independently within 2-4 weeks.

## 2021-11-08 NOTE — PROGRESS NOTE ADULT - SUBJECTIVE AND OBJECTIVE BOX
CC: Patient is a 81y old  Female who presents with a chief complaint of     ## HPI:HPI:  82YO F hx of TIA, PCA CVA, HTN, p/w L arm, L leg weakness, onset 11 am on 11/05. code stroke called, CT angio head/neck showing no abnormalities, no core infarct/hypoperfused tissue. tPA pushed at 13:48. Home meds include losartan, hydralazine, metoprolol, plavix, gabapentin (restless leg syndrome). Pt denies h/a, visual changes, nausea, vomiting, aphasia, confusion, numbness/tingling of arms/legs.  (05 Nov 2021 14:00)      O/N: admitted to icu s/p tpa    ## ROS:  complains of left arm weakness  ## EXAM  ICU Vital Signs Last 24 Hrs  T(C): 36.4 (06 Nov 2021 08:00), Max: 36.6 (05 Nov 2021 13:01)  T(F): 97.5 (06 Nov 2021 08:00), Max: 97.9 (05 Nov 2021 15:22)  HR: 54 (06 Nov 2021 10:00) (46 - 75)  BP: 157/63 (06 Nov 2021 10:00) (144/119 - 193/94)  BP(mean): 86 (06 Nov 2021 10:00) (83 - 125)  ABP: --  ABP(mean): --  RR: 13 (06 Nov 2021 10:00) (11 - 24)  SpO2: 99% (06 Nov 2021 10:00) (96% - 100%)    CON : NAD  EENT : EOMI, MMM  NECK : Full ROM  RESP : CTAB no increased WOB  CARD : rrr no m/r/g  ABD : S NT ND NABS no rebound  EXT : No edema  NEURO : AAOX3 4/5 strength LUE 4+/5 LLE. normal right sided extremitites  ## Labs:  Lab Results:  CBC  CBC Full  -  ( 06 Nov 2021 03:49 )  WBC Count : 3.07 K/uL  RBC Count : 3.51 M/uL  Hemoglobin : 9.9 g/dL  Hematocrit : 31.0 %  Platelet Count - Automated : 249 K/uL  Mean Cell Volume : 88.3 fl  Mean Cell Hemoglobin : 28.2 pg  Mean Cell Hemoglobin Concentration : 31.9 gm/dL  Auto Neutrophil # : x  Auto Lymphocyte # : x  Auto Monocyte # : x  Auto Eosinophil # : x  Auto Basophil # : x  Auto Neutrophil % : x  Auto Lymphocyte % : x  Auto Monocyte % : x  Auto Eosinophil % : x  Auto Basophil % : x    .		Differential:	[] Automated		[] Manual  Chemistry                        9.9    3.07  )-----------( 249      ( 06 Nov 2021 03:49 )             31.0     11-06    141  |  111<H>  |  14  ----------------------------<  91  3.8   |  26  |  1.11    Ca    8.9      06 Nov 2021 03:49  Phos  3.4     11-06  Mg     2.2     11-06    TPro  6.7  /  Alb  3.0<L>  /  TBili  0.5  /  DBili  x   /  AST  18  /  ALT  11<L>  /  AlkPhos  54  11-06    LIVER FUNCTIONS - ( 06 Nov 2021 03:49 )  Alb: 3.0 g/dL / Pro: 6.7 gm/dL / ALK PHOS: 54 U/L / ALT: 11 U/L / AST: 18 U/L / GGT: x           PT/INR - ( 05 Nov 2021 13:34 )   PT: 12.4 sec;   INR: 1.07 ratio         PTT - ( 05 Nov 2021 13:34 )  PTT:24.4 sec      MICROBIOLOGY/CULTURES:      RADIOLOGY RESULTS:  await mr and repeat head ct      ## Medications:  MEDICATIONS  (STANDING):  chlorhexidine 2% Cloths 1 Application(s) Topical <User Schedule>  simvastatin 40 milliGRAM(s) Oral at bedtime    ## O/E:I&O's Summary    05 Nov 2021 07:01  -  06 Nov 2021 07:00  --------------------------------------------------------  IN: 0 mL / OUT: 500 mL / NET: -500 mL        POCUS :   DVT PPX  ## Code status:  Goals of care discussion: [] yes [ ] no  [x] full code  [ ] DNR  [ ] DNI  [ ] LCUY  
Pt resting comfortably, no acute events. She says improvement is made with weakness.   Physical Exam:   · Neurological	detailed exam  · Mental Status	AOX3. Language intact.  · Cranial Nerve	mild Left facial asymmetry.  PERRLA. CN2-12 grossly intact otherwise.  · Motor	LUE drift 4+5   LLE 4+/5  RUE/RLE 5/5  · Sensory	intact to LT  · Gait/Balance	FNF wnl  gait deferred.  · Neurological Comments	NIHSS 2 MRS 0      · Assessment	  Left sided weakness s/p TPA- Cortical motor strip CVA appears cardioembolic  HTN  Past TIAs    Plan  She was on Plavix previously, would do ASA/Plavix for 3 months   Lipitor 80mg  PT/OT/ST  echo normal EF, no LA dilatation  JORGE/Loop recorder, can be done outpt  neuro stable  
Pt resting comfortably, no acute events. She says improvement is made with weakness.   Physical Exam:   · Neurological	detailed exam  · Mental Status	AOX3. Language intact.  · Cranial Nerve	mild Left facial asymmetry.  PERRLA. CN2-12 grossly intact otherwise.  · Motor	LUE drift 4+5   LLE 4+/5  RUE/RLE 5/5  · Sensory	intact to LT  · Gait/Balance	FNF wnl  gait deferred.  · Neurological Comments	NIHSS 2 MRS 0      · Assessment	  Left sided weakness s/p TPA- Cortical motor strip CVA appears cardioembolic  HTN  Past TIAs    Plan  She was on Plavix previously, would do ASA/Plavix for 3 months   Lipitor 80mg  PT/OT/ST  echo normal EF, no LA dilatation  JORGE/Loop recorder, can be done outpt  will continue to follow  
Patient is a 81y old  Female who presents with a chief complaint of     INTERVAL HPI/OVERNIGHT EVENTS: none   eating well. still has some left side weakness but improving     MEDICATIONS  (STANDING):  amLODIPine   Tablet 2.5 milliGRAM(s) Oral daily  aspirin enteric coated 81 milliGRAM(s) Oral daily  atorvastatin 80 milliGRAM(s) Oral at bedtime  clopidogrel Tablet 75 milliGRAM(s) Oral daily  enoxaparin Injectable 40 milliGRAM(s) SubCutaneous daily  pantoprazole    Tablet 40 milliGRAM(s) Oral before breakfast    MEDICATIONS  (PRN):      Allergies    No Known Allergies    Intolerances        REVIEW OF SYSTEMS:  CONSTITUTIONAL: No fever, weight loss, or fatigue  EYES: No eye pain, visual disturbances, or discharge  ENMT:  No difficulty hearing, tinnitus, vertigo; No sinus or throat pain  NECK: No pain or stiffness  BREASTS: No pain, masses, or nipple discharge  RESPIRATORY: No cough, wheezing, chills or hemoptysis; No shortness of breath  CARDIOVASCULAR: No chest pain, palpitations, dizziness, or leg swelling  GASTROINTESTINAL: No abdominal or epigastric pain. No nausea, vomiting, or hematemesis; No diarrhea or constipation. No melena or hematochezia.  GENITOURINARY: No dysuria, frequency, hematuria, or incontinence  NEUROLOGICAL: No headaches, memory loss,   SKIN: No itching, burning, rashes, or lesions   LYMPH NODES: No enlarged glands  ENDOCRINE: No heat or cold intolerance; No hair loss  MUSCULOSKELETAL: No joint pain or swelling; No muscle, back, or extremity pain  PSYCHIATRIC: No depression, anxiety, mood swings, or difficulty sleeping  HEME/LYMPH: No easy bruising, or bleeding gums  ALLERGY AND IMMUNOLOGIC: No hives or eczema    Vital Signs Last 24 Hrs  T(C): 36.6 (07 Nov 2021 10:01), Max: 36.8 (06 Nov 2021 18:21)  T(F): 97.9 (07 Nov 2021 10:01), Max: 98.2 (06 Nov 2021 18:21)  HR: 85 (07 Nov 2021 10:01) (48 - 85)  BP: 158/91 (07 Nov 2021 10:01) (127/85 - 176/91)  BP(mean): 91 (06 Nov 2021 17:00) (89 - 98)  RR: 18 (07 Nov 2021 10:01) (11 - 20)  SpO2: 99% (07 Nov 2021 10:01) (98% - 100%)    PHYSICAL EXAM:  GENERAL: NAD, well-groomed, well-developed  HEAD:  Atraumatic, Normocephalic  EYES: EOMI, PERRLA, conjunctiva and sclera clear  ENMT: No tonsillar erythema, exudates, or enlargement; Moist mucous membranes, Good dentition, No lesions  NECK: Supple, No JVD, Normal thyroid  NERVOUS SYSTEM:  Alert & Oriented X3, Good concentration; Motor Strength 4/5 L upper and lower extremities; DTRs 2+ intact and symmetric  CHEST/LUNG: Clear to percussion bilaterally; No rales, rhonchi, wheezing, or rubs  HEART: Regular rate and rhythm; No murmurs, rubs, or gallops  ABDOMEN: Soft, Nontender, Nondistended; Bowel sounds present  EXTREMITIES:  2+ Peripheral Pulses, No clubbing, cyanosis, or edema  LYMPH: No lymphadenopathy noted  SKIN: No rashes or lesions    LABS:                        10.3   2.92  )-----------( 259      ( 07 Nov 2021 07:00 )             31.8     11-07    143  |  109<H>  |  10  ----------------------------<  85  4.0   |  29  |  1.23    Ca    9.1      07 Nov 2021 07:00  Phos  3.1     11-07  Mg     2.1     11-07    TPro  7.0  /  Alb  3.2<L>  /  TBili  0.6  /  DBili  x   /  AST  12<L>  /  ALT  13  /  AlkPhos  57  11-07    PT/INR - ( 05 Nov 2021 13:34 )   PT: 12.4 sec;   INR: 1.07 ratio         PTT - ( 05 Nov 2021 13:34 )  PTT:24.4 sec    CAPILLARY BLOOD GLUCOSE          RADIOLOGY & ADDITIONAL TESTS:    Imaging Personally Reviewed:  [ ] YES  [ ] NO    Consultant(s) Notes Reviewed:  [ ] YES  [ ] NO    Care Discussed with Consultants/Other Providers [ ] YES  [ ] NO

## 2021-11-08 NOTE — OCCUPATIONAL THERAPY INITIAL EVALUATION ADULT - GENERAL OBSERVATIONS, REHAB EVAL
Pt was encountered OOB in chair; NAD, portable telemetry +, AXOX4, coherent, comprehension intact, required extra time for cognitive processing, followed 1 step commands and followed multi step commands 50% of the time.

## 2021-11-08 NOTE — DISCHARGE NOTE PROVIDER - HOSPITAL COURSE
82YO F hx of TIA, PCA CVA, HTN, presented to ED with L arm, L leg weakness, onset 11 am on 11/05. code stroke called, CT angio head/neck showing no abnormalities, no core infarct/hypoperfused tissue. Admitted to ICU for 24h monitoring s/p tPA for ischemic stroke, likely MCA/MIKE. Neuro checks and vital signs stable.  Pt alert and oriented, minimal right sided residual weakness (4/5 strength) noted. DVT ppx and statin ordered. MRI of Brain done- Small acute/subacute infarcts within the right motor strip with associated cytotoxic edema and without hemorrhagic transformation. Multiple additional chronic infarcts and mild chronic white matter microvascular type changes, as discussed. MRA head: No large vessel occlusion or major stenosis. seen By Dr Posadas (neurology), Ok to downgrade to telemetry ASA, plavix for 3 months as per neuro.    CVA   - HDS and AAOx4. mild left sided weakness   -  PT/OT ordered,   -  echo  -  no events on tele   - asa/plavix for months     HTN   -on hydralazine 75 mg bid at home   - start home BB at half dose and watch for bradycardia.     dispo   - pending pt consult but pt is adamant about going home

## 2021-11-08 NOTE — OCCUPATIONAL THERAPY INITIAL EVALUATION ADULT - ADDITIONAL COMMENTS
Pt lives alone (Pts brother lives on second floor of home) in a private house with 4 steps to enter with bilateral handrails. Once inside, the pt main bedroom and bathroom is on that floor when entering. The pts bathroom has a tub/shower combination, regular toilet and no DME. The pt ambulates with a cane normally, but does own a rolling walker for ambulation at night time. The pt wears glasses for reading and distance and drives.

## 2021-11-08 NOTE — DISCHARGE NOTE NURSING/CASE MANAGEMENT/SOCIAL WORK - PATIENT PORTAL LINK FT
You can access the FollowMyHealth Patient Portal offered by [facility] by registering at the following website: [email or web address]. By joining [facility]’s FollowMyHealth portal, you will also be able to view your health information using other applications (apps) compatible with our system.

## 2021-11-08 NOTE — DISCHARGE NOTE PROVIDER - CARE PROVIDER_API CALL
Jaime Posadas (DO)  Neurology  [address] [place] [address]  Phone: [phone]  Fax: ()-  Follow Up Time: 1 week

## 2021-11-12 DIAGNOSIS — I10 ESSENTIAL (PRIMARY) HYPERTENSION: ICD-10-CM

## 2021-11-12 DIAGNOSIS — G81.94 HEMIPLEGIA, UNSPECIFIED AFFECTING LEFT NONDOMINANT SIDE: ICD-10-CM

## 2021-11-12 DIAGNOSIS — I63.511 CEREBRAL INFARCTION DUE TO UNSPECIFIED OCCLUSION OR STENOSIS OF RIGHT MIDDLE CEREBRAL ARTERY: ICD-10-CM

## 2021-11-12 DIAGNOSIS — Z86.73 PERSONAL HISTORY OF TRANSIENT ISCHEMIC ATTACK (TIA), AND CEREBRAL INFARCTION WITHOUT RESIDUAL DEFICITS: ICD-10-CM

## 2021-11-12 DIAGNOSIS — G25.81 RESTLESS LEGS SYNDROME: ICD-10-CM

## 2021-11-12 DIAGNOSIS — R29.704 NIHSS SCORE 4: ICD-10-CM

## 2021-11-12 DIAGNOSIS — N17.9 ACUTE KIDNEY FAILURE, UNSPECIFIED: ICD-10-CM

## 2021-11-12 DIAGNOSIS — I63.9 CEREBRAL INFARCTION, UNSPECIFIED: ICD-10-CM

## 2022-05-26 NOTE — OCCUPATIONAL THERAPY INITIAL EVALUATION ADULT - ASSISTIVE DEVICE FOR TRANSFER: BED/CHAIR, REHAB EVAL
What Type Of Note Output Would You Prefer (Optional)?: Standard Output How Severe Is Your Skin Lesion?: mild treated_been_treated Is This A New Presentation, Or A Follow-Up?: Skin Lesions rolling walker

## 2025-01-31 NOTE — ED PROVIDER NOTE - WR ORDER DATE AND TIME 1
Not a duplicate  Pharmacy told pt there were no more refills.    Reason for call:   [x] Refill   [] Prior Auth  [] Other:     Office:   [] PCP/Provider -   [x] Specialty/Provider - Psych    Medication: DULoxetine (CYMBALTA) 20 mg capsule     Dose/Frequency: Take 2 capsules (40 mg total) by mouth 2 (two) times a day     Quantity: 360    Pharmacy:  [facility]/pharmacy #[id number] - [place] - [address] [place] [address] RD     Does the patient have enough for 3 days?   [x] Yes   [] No - Send as HP to POD     05-Nov-2021 13:01